# Patient Record
Sex: MALE | Race: BLACK OR AFRICAN AMERICAN | NOT HISPANIC OR LATINO
[De-identification: names, ages, dates, MRNs, and addresses within clinical notes are randomized per-mention and may not be internally consistent; named-entity substitution may affect disease eponyms.]

---

## 2017-02-26 ENCOUNTER — TRANSCRIPTION ENCOUNTER (OUTPATIENT)
Age: 57
End: 2017-02-26

## 2018-01-29 ENCOUNTER — APPOINTMENT (OUTPATIENT)
Dept: CARDIOLOGY | Facility: CLINIC | Age: 58
End: 2018-01-29
Payer: COMMERCIAL

## 2018-01-29 ENCOUNTER — NON-APPOINTMENT (OUTPATIENT)
Age: 58
End: 2018-01-29

## 2018-01-29 VITALS
HEIGHT: 71 IN | HEART RATE: 85 BPM | TEMPERATURE: 98.7 F | BODY MASS INDEX: 29.68 KG/M2 | OXYGEN SATURATION: 97 % | DIASTOLIC BLOOD PRESSURE: 83 MMHG | SYSTOLIC BLOOD PRESSURE: 124 MMHG | WEIGHT: 212 LBS | RESPIRATION RATE: 14 BRPM

## 2018-01-29 PROCEDURE — 99215 OFFICE O/P EST HI 40 MIN: CPT | Mod: 25

## 2018-01-29 PROCEDURE — 93000 ELECTROCARDIOGRAM COMPLETE: CPT

## 2018-02-14 ENCOUNTER — APPOINTMENT (OUTPATIENT)
Dept: CARDIOLOGY | Facility: CLINIC | Age: 58
End: 2018-02-14
Payer: COMMERCIAL

## 2018-02-14 PROCEDURE — 93306 TTE W/DOPPLER COMPLETE: CPT

## 2018-02-15 ENCOUNTER — APPOINTMENT (OUTPATIENT)
Dept: CARDIOLOGY | Facility: CLINIC | Age: 58
End: 2018-02-15
Payer: COMMERCIAL

## 2018-02-15 PROCEDURE — 93015 CV STRESS TEST SUPVJ I&R: CPT

## 2018-02-15 PROCEDURE — A9500: CPT

## 2018-02-15 PROCEDURE — 78452 HT MUSCLE IMAGE SPECT MULT: CPT

## 2018-02-19 LAB
25(OH)D3 SERPL-MCNC: 11 NG/ML
ALBUMIN SERPL ELPH-MCNC: 4.2 G/DL
ALP BLD-CCNC: 85 U/L
ALT SERPL-CCNC: 45 U/L
ANION GAP SERPL CALC-SCNC: 13 MMOL/L
APPEARANCE: CLEAR
AST SERPL-CCNC: 29 U/L
BASOPHILS # BLD AUTO: 0.03 K/UL
BASOPHILS NFR BLD AUTO: 0.6 %
BILIRUB SERPL-MCNC: 0.5 MG/DL
BILIRUBIN URINE: NEGATIVE
BLOOD URINE: NEGATIVE
BUN SERPL-MCNC: 12 MG/DL
CALCIUM SERPL-MCNC: 9.3 MG/DL
CHLORIDE SERPL-SCNC: 100 MMOL/L
CHOLEST SERPL-MCNC: 270 MG/DL
CHOLEST/HDLC SERPL: 8.7 RATIO
CO2 SERPL-SCNC: 26 MMOL/L
COLOR: YELLOW
CREAT SERPL-MCNC: 0.9 MG/DL
EOSINOPHIL # BLD AUTO: 0.13 K/UL
EOSINOPHIL NFR BLD AUTO: 2.6 %
GLUCOSE QUALITATIVE U: NEGATIVE MG/DL
GLUCOSE SERPL-MCNC: 176 MG/DL
HBA1C MFR BLD HPLC: 6.7 %
HCT VFR BLD CALC: 39.9 %
HDLC SERPL-MCNC: 31 MG/DL
HGB BLD-MCNC: 13.7 G/DL
IMM GRANULOCYTES NFR BLD AUTO: 0.2 %
KETONES URINE: NEGATIVE
LDLC SERPL CALC-MCNC: NORMAL
LEUKOCYTE ESTERASE URINE: NEGATIVE
LYMPHOCYTES # BLD AUTO: 1.8 K/UL
LYMPHOCYTES NFR BLD AUTO: 36.3 %
MAN DIFF?: NORMAL
MCHC RBC-ENTMCNC: 32.4 PG
MCHC RBC-ENTMCNC: 34.3 GM/DL
MCV RBC AUTO: 94.3 FL
MONOCYTES # BLD AUTO: 0.37 K/UL
MONOCYTES NFR BLD AUTO: 7.5 %
NEUTROPHILS # BLD AUTO: 2.62 K/UL
NEUTROPHILS NFR BLD AUTO: 52.8 %
NITRITE URINE: NEGATIVE
PH URINE: 7
PLATELET # BLD AUTO: 156 K/UL
POTASSIUM SERPL-SCNC: 4.2 MMOL/L
PROT SERPL-MCNC: 6.3 G/DL
PROTEIN URINE: NEGATIVE MG/DL
PSA FREE FLD-MCNC: 38.7
PSA FREE SERPL-MCNC: 0.24 NG/ML
PSA SERPL-MCNC: 0.62 NG/ML
RBC # BLD: 4.23 M/UL
RBC # FLD: 12.7 %
SODIUM SERPL-SCNC: 139 MMOL/L
SPECIFIC GRAVITY URINE: 1.02
TRIGL SERPL-MCNC: 526 MG/DL
TSH SERPL-ACNC: 2.47 UIU/ML
UROBILINOGEN URINE: 1 MG/DL
WBC # FLD AUTO: 4.96 K/UL

## 2018-02-26 ENCOUNTER — MEDICATION RENEWAL (OUTPATIENT)
Age: 58
End: 2018-02-26

## 2018-02-26 ENCOUNTER — APPOINTMENT (OUTPATIENT)
Dept: CARDIOLOGY | Facility: CLINIC | Age: 58
End: 2018-02-26
Payer: COMMERCIAL

## 2018-02-26 VITALS
RESPIRATION RATE: 16 BRPM | BODY MASS INDEX: 29.12 KG/M2 | TEMPERATURE: 97.7 F | HEART RATE: 82 BPM | SYSTOLIC BLOOD PRESSURE: 118 MMHG | HEIGHT: 71 IN | DIASTOLIC BLOOD PRESSURE: 78 MMHG | WEIGHT: 208 LBS

## 2018-02-26 DIAGNOSIS — E11.10 TYPE 2 DIABETES MELLITUS WITH KETOACIDOSIS WITHOUT COMA: ICD-10-CM

## 2018-02-26 DIAGNOSIS — R06.02 SHORTNESS OF BREATH: ICD-10-CM

## 2018-02-26 PROCEDURE — 99214 OFFICE O/P EST MOD 30 MIN: CPT

## 2018-03-01 ENCOUNTER — TRANSCRIPTION ENCOUNTER (OUTPATIENT)
Age: 58
End: 2018-03-01

## 2018-03-23 ENCOUNTER — APPOINTMENT (OUTPATIENT)
Dept: CARDIOLOGY | Facility: CLINIC | Age: 58
End: 2018-03-23

## 2018-04-04 ENCOUNTER — APPOINTMENT (OUTPATIENT)
Dept: CARDIOLOGY | Facility: CLINIC | Age: 58
End: 2018-04-04
Payer: COMMERCIAL

## 2018-04-04 PROCEDURE — 36415 COLL VENOUS BLD VENIPUNCTURE: CPT

## 2018-04-05 LAB
ALBUMIN SERPL ELPH-MCNC: 4.8 G/DL
ALP BLD-CCNC: 56 U/L
ALT SERPL-CCNC: 29 U/L
ANION GAP SERPL CALC-SCNC: 13 MMOL/L
AST SERPL-CCNC: 28 U/L
BILIRUB SERPL-MCNC: 0.7 MG/DL
BUN SERPL-MCNC: 14 MG/DL
CALCIUM SERPL-MCNC: 9.6 MG/DL
CHLORIDE SERPL-SCNC: 102 MMOL/L
CHOLEST SERPL-MCNC: 165 MG/DL
CHOLEST/HDLC SERPL: 2.9 RATIO
CK SERPL-CCNC: 276 U/L
CO2 SERPL-SCNC: 24 MMOL/L
CREAT SERPL-MCNC: 1.05 MG/DL
GLUCOSE SERPL-MCNC: 93 MG/DL
HDLC SERPL-MCNC: 56 MG/DL
LDLC SERPL CALC-MCNC: 95 MG/DL
POTASSIUM SERPL-SCNC: 4.3 MMOL/L
PROT SERPL-MCNC: 7.5 G/DL
SODIUM SERPL-SCNC: 139 MMOL/L
TRIGL SERPL-MCNC: 70 MG/DL

## 2018-04-09 ENCOUNTER — APPOINTMENT (OUTPATIENT)
Dept: CARDIOLOGY | Facility: CLINIC | Age: 58
End: 2018-04-09
Payer: COMMERCIAL

## 2018-04-09 VITALS
SYSTOLIC BLOOD PRESSURE: 118 MMHG | BODY MASS INDEX: 28 KG/M2 | DIASTOLIC BLOOD PRESSURE: 74 MMHG | OXYGEN SATURATION: 95 % | RESPIRATION RATE: 16 BRPM | HEART RATE: 76 BPM | TEMPERATURE: 98.2 F | HEIGHT: 71 IN | WEIGHT: 200 LBS

## 2018-04-09 PROCEDURE — 99214 OFFICE O/P EST MOD 30 MIN: CPT

## 2018-05-21 ENCOUNTER — RX RENEWAL (OUTPATIENT)
Age: 58
End: 2018-05-21

## 2018-06-14 ENCOUNTER — RX RENEWAL (OUTPATIENT)
Age: 58
End: 2018-06-14

## 2018-07-30 ENCOUNTER — APPOINTMENT (OUTPATIENT)
Dept: CARDIOLOGY | Facility: CLINIC | Age: 58
End: 2018-07-30
Payer: COMMERCIAL

## 2018-07-30 ENCOUNTER — NON-APPOINTMENT (OUTPATIENT)
Age: 58
End: 2018-07-30

## 2018-07-30 VITALS
DIASTOLIC BLOOD PRESSURE: 70 MMHG | SYSTOLIC BLOOD PRESSURE: 110 MMHG | RESPIRATION RATE: 16 BRPM | HEART RATE: 87 BPM | HEIGHT: 71 IN | OXYGEN SATURATION: 98 % | TEMPERATURE: 98.7 F | BODY MASS INDEX: 27.58 KG/M2 | WEIGHT: 197 LBS

## 2018-07-30 VITALS — HEIGHT: 71 IN | BODY MASS INDEX: 27.58 KG/M2 | WEIGHT: 197 LBS

## 2018-07-30 VITALS — OXYGEN SATURATION: 96 % | RESPIRATION RATE: 15 BRPM | HEART RATE: 70 BPM | TEMPERATURE: 98 F

## 2018-07-30 VITALS — DIASTOLIC BLOOD PRESSURE: 70 MMHG | SYSTOLIC BLOOD PRESSURE: 110 MMHG

## 2018-07-30 DIAGNOSIS — R07.89 OTHER CHEST PAIN: ICD-10-CM

## 2018-07-30 PROCEDURE — 93000 ELECTROCARDIOGRAM COMPLETE: CPT

## 2018-07-30 PROCEDURE — 99214 OFFICE O/P EST MOD 30 MIN: CPT | Mod: 25

## 2018-08-15 ENCOUNTER — RX RENEWAL (OUTPATIENT)
Age: 58
End: 2018-08-15

## 2018-08-16 ENCOUNTER — MEDICATION RENEWAL (OUTPATIENT)
Age: 58
End: 2018-08-16

## 2019-02-25 ENCOUNTER — NON-APPOINTMENT (OUTPATIENT)
Age: 59
End: 2019-02-25

## 2019-02-25 ENCOUNTER — APPOINTMENT (OUTPATIENT)
Dept: CARDIOLOGY | Facility: CLINIC | Age: 59
End: 2019-02-25
Payer: COMMERCIAL

## 2019-02-25 VITALS
HEIGHT: 71 IN | DIASTOLIC BLOOD PRESSURE: 76 MMHG | BODY MASS INDEX: 28.98 KG/M2 | OXYGEN SATURATION: 97 % | SYSTOLIC BLOOD PRESSURE: 122 MMHG | WEIGHT: 207 LBS | HEART RATE: 72 BPM

## 2019-02-25 DIAGNOSIS — E55.9 VITAMIN D DEFICIENCY, UNSPECIFIED: ICD-10-CM

## 2019-02-25 PROCEDURE — 93000 ELECTROCARDIOGRAM COMPLETE: CPT

## 2019-02-25 PROCEDURE — 99214 OFFICE O/P EST MOD 30 MIN: CPT | Mod: 25

## 2019-02-25 RX ORDER — BLOOD SUGAR DIAGNOSTIC
STRIP MISCELLANEOUS TWICE DAILY
Qty: 2 | Refills: 1 | Status: ACTIVE | COMMUNITY
Start: 2018-02-26 | End: 1900-01-01

## 2019-02-25 RX ORDER — BLOOD-GLUCOSE METER
W/DEVICE EACH MISCELLANEOUS
Qty: 1 | Refills: 0 | Status: ACTIVE | COMMUNITY
Start: 2018-02-26 | End: 1900-01-01

## 2019-02-25 RX ORDER — LANCETS
EACH MISCELLANEOUS
Qty: 100 | Refills: 1 | Status: ACTIVE | COMMUNITY
Start: 2018-02-26 | End: 1900-01-01

## 2019-02-25 NOTE — HISTORY OF PRESENT ILLNESS
[FreeTextEntry1] : 59 Y/O gentleman PMH: DM, HLD, mildly depressed LV FX, diverticulosis,  elevated CPK , right renal cyst who presents today in routine follow up\par \par States he ran out of his meds in January and relates he did not contact office for refills so he has stopped all Meds and is presently not taking any medications \par \par Echo 2018 AO upper limits of norm with mildly depressed LV FX ( off ACE )\par Nuclear 2018\par \par \par \par \par \par his nuclear stress test was normal range   , however his echo showed mild global hypokinesis  LVH ,  blood sugar was elevated , with elevated TG and TC  low vitamin d \par  . \par Patients blood pressure is controlled ,  \par \par Patient was seen by dr mohan  , had scant blood in urine , CT abdomen showing right renal cyst 4.7 cm .

## 2019-02-25 NOTE — PHYSICAL EXAM
[General Appearance - Well Developed] : well developed [Normal Appearance] : normal appearance [Well Groomed] : well groomed [General Appearance - Well Nourished] : well nourished [No Deformities] : no deformities [General Appearance - In No Acute Distress] : no acute distress [Normal Conjunctiva] : the conjunctiva exhibited no abnormalities [] : no respiratory distress [Respiration, Rhythm And Depth] : normal respiratory rhythm and effort [Exaggerated Use Of Accessory Muscles For Inspiration] : no accessory muscle use [Auscultation Breath Sounds / Voice Sounds] : lungs were clear to auscultation bilaterally [Heart Rate And Rhythm] : heart rate and rhythm were normal [Heart Sounds] : normal S1 and S2 [Abdomen Soft] : soft [Abnormal Walk] : normal gait [FreeTextEntry1] : No lE edema  [Skin Color & Pigmentation] : normal skin color and pigmentation [Oriented To Time, Place, And Person] : oriented to person, place, and time

## 2019-02-25 NOTE — DISCUSSION/SUMMARY
[FreeTextEntry1] : Depressed LV FX Mild by Echo will moniter yearly and ordered today \par \par HTN well controlled will resume medications as previously taking\par \par DM: Will Obtain additional labs, resume al medications and continue with home glucose monitoring.  Additional screening testing ordered \par \par Advised strict adherence to taking daily medications as prescribed\par \par OV 3 weeks

## 2019-02-25 NOTE — REASON FOR VISIT
[Follow-Up - Clinic] : a clinic follow-up of [Hyperlipidemia] : hyperlipidemia [Hypertension] : hypertension [Medication Management] : Medication management [FreeTextEntry1] : DM

## 2019-03-18 ENCOUNTER — APPOINTMENT (OUTPATIENT)
Dept: CARDIOLOGY | Facility: CLINIC | Age: 59
End: 2019-03-18
Payer: COMMERCIAL

## 2019-03-18 VITALS
HEART RATE: 60 BPM | OXYGEN SATURATION: 96 % | BODY MASS INDEX: 28.42 KG/M2 | HEIGHT: 71 IN | WEIGHT: 203 LBS | SYSTOLIC BLOOD PRESSURE: 119 MMHG | DIASTOLIC BLOOD PRESSURE: 70 MMHG | RESPIRATION RATE: 18 BRPM

## 2019-03-18 PROCEDURE — 99214 OFFICE O/P EST MOD 30 MIN: CPT

## 2019-03-18 RX ORDER — GEMFIBROZIL 600 MG/1
600 TABLET, FILM COATED ORAL TWICE DAILY
Qty: 60 | Refills: 2 | Status: DISCONTINUED | COMMUNITY
Start: 2018-02-19 | End: 2019-03-18

## 2019-03-21 NOTE — DISCUSSION/SUMMARY
[FreeTextEntry1] : Depressed LV FX Mild by Echo.  Has since resumed ACE and will moniter with yearly surveillance echocardiograms ( Ordered ) \par \par HTN: Well controlled will continue current medications \par \par DM: Continue with home glucose monitoring.  Additional CV screening testing ordered \par \par HLD not well controlled will stop Gemfibrozil and begin Livalo repeat labs 6 weeks \par \par Low Vit D: replaced as ordered \par \par Advised strict adherence to taking daily medications as prescribed\par \par OV 3 months

## 2019-03-21 NOTE — HISTORY OF PRESENT ILLNESS
[FreeTextEntry1] : 57 Y/O gentleman PMH: DM, HLD, mildly depressed LV FX, diverticulosis,  elevated CPK , right renal cyst who presents today for BP check after resuming his antihypertensive medications which he claims to be tolerating well and pressure well controlled. \par \par recent labs reviewed Vit D replaced.  Lipids/LDL elevated \par \par Echo 2018 AO upper limits of norm with mildly depressed LV FX ( off ACE )\par Nuclear 2018\par \par \par \par \par \par his nuclear stress test was normal range   , however his echo showed mild global hypokinesis  LVH ,  blood sugar was elevated , with elevated TG and TC  low vitamin d \par  . \par Patients blood pressure is controlled ,  \par \par Patient was seen by dr mohan  , had scant blood in urine , CT abdomen showing right renal cyst 4.7 cm .

## 2019-03-21 NOTE — PHYSICAL EXAM
[Well Groomed] : well groomed [General Appearance - Well Developed] : well developed [Normal Appearance] : normal appearance [General Appearance - Well Nourished] : well nourished [No Deformities] : no deformities [General Appearance - In No Acute Distress] : no acute distress [Normal Conjunctiva] : the conjunctiva exhibited no abnormalities [Respiration, Rhythm And Depth] : normal respiratory rhythm and effort [] : no respiratory distress [Exaggerated Use Of Accessory Muscles For Inspiration] : no accessory muscle use [Auscultation Breath Sounds / Voice Sounds] : lungs were clear to auscultation bilaterally [Heart Rate And Rhythm] : heart rate and rhythm were normal [Abdomen Soft] : soft [Heart Sounds] : normal S1 and S2 [Abnormal Walk] : normal gait [Skin Color & Pigmentation] : normal skin color and pigmentation [Oriented To Time, Place, And Person] : oriented to person, place, and time [FreeTextEntry1] : No lE edema

## 2019-04-16 ENCOUNTER — RX CHANGE (OUTPATIENT)
Age: 59
End: 2019-04-16

## 2019-04-18 ENCOUNTER — RX CHANGE (OUTPATIENT)
Age: 59
End: 2019-04-18

## 2019-04-24 ENCOUNTER — RX RENEWAL (OUTPATIENT)
Age: 59
End: 2019-04-24

## 2019-04-24 ENCOUNTER — RX CHANGE (OUTPATIENT)
Age: 59
End: 2019-04-24

## 2019-04-24 RX ORDER — PITAVASTATIN CALCIUM 2.09 MG/1
2 TABLET, FILM COATED ORAL
Qty: 30 | Refills: 3 | Status: DISCONTINUED | COMMUNITY
Start: 2019-03-18 | End: 2019-04-24

## 2019-05-20 ENCOUNTER — MEDICATION RENEWAL (OUTPATIENT)
Age: 59
End: 2019-05-20

## 2019-05-20 ENCOUNTER — RX RENEWAL (OUTPATIENT)
Age: 59
End: 2019-05-20

## 2019-05-21 ENCOUNTER — MEDICATION RENEWAL (OUTPATIENT)
Age: 59
End: 2019-05-21

## 2019-05-21 ENCOUNTER — RX RENEWAL (OUTPATIENT)
Age: 59
End: 2019-05-21

## 2019-07-22 ENCOUNTER — RX RENEWAL (OUTPATIENT)
Age: 59
End: 2019-07-22

## 2019-07-23 ENCOUNTER — MEDICATION RENEWAL (OUTPATIENT)
Age: 59
End: 2019-07-23

## 2019-10-31 ENCOUNTER — MEDICATION RENEWAL (OUTPATIENT)
Age: 59
End: 2019-10-31

## 2019-11-11 ENCOUNTER — RX RENEWAL (OUTPATIENT)
Age: 59
End: 2019-11-11

## 2019-11-13 ENCOUNTER — RX RENEWAL (OUTPATIENT)
Age: 59
End: 2019-11-13

## 2019-12-03 ENCOUNTER — APPOINTMENT (OUTPATIENT)
Dept: UROLOGY | Facility: CLINIC | Age: 59
End: 2019-12-03
Payer: COMMERCIAL

## 2019-12-03 VITALS
DIASTOLIC BLOOD PRESSURE: 71 MMHG | BODY MASS INDEX: 29.31 KG/M2 | HEIGHT: 71.5 IN | HEART RATE: 71 BPM | SYSTOLIC BLOOD PRESSURE: 109 MMHG | WEIGHT: 214 LBS | OXYGEN SATURATION: 97 %

## 2019-12-03 PROCEDURE — 99244 OFF/OP CNSLTJ NEW/EST MOD 40: CPT

## 2019-12-03 NOTE — REVIEW OF SYSTEMS
[Eyesight Problems] : eyesight problems [Poor quality erections] : Poor quality erections [Strain or push to urinate] : strain or push to urinate [Slow urine stream] : slow urine stream [Negative] : Heme/Lymph

## 2019-12-03 NOTE — PHYSICAL EXAM
[Normal Appearance] : normal appearance [General Appearance - Well Developed] : well developed [General Appearance - Well Nourished] : well nourished [Edema] : no peripheral edema [Well Groomed] : well groomed [General Appearance - In No Acute Distress] : no acute distress [Exaggerated Use Of Accessory Muscles For Inspiration] : no accessory muscle use [Respiration, Rhythm And Depth] : normal respiratory rhythm and effort [Abdomen Soft] : soft [Abdomen Tenderness] : non-tender [Costovertebral Angle Tenderness] : no ~M costovertebral angle tenderness [Urethral Meatus] : meatus normal [Urinary Bladder Findings] : the bladder was normal on palpation [Scrotum] : the scrotum was normal [Prostate Tenderness] : the prostate was not tender [No Prostate Nodules] : no prostate nodules [Testes Mass (___cm)] : there were no testicular masses [Normal Station and Gait] : the gait and station were normal for the patient's age [] : no rash [Oriented To Time, Place, And Person] : oriented to person, place, and time [Affect] : the affect was normal [No Focal Deficits] : no focal deficits [Not Anxious] : not anxious [No Palpable Adenopathy] : no palpable adenopathy [Mood] : the mood was normal

## 2019-12-04 LAB
APPEARANCE: CLEAR
BACTERIA: NEGATIVE
BILIRUBIN URINE: NEGATIVE
BLOOD URINE: NEGATIVE
COLOR: YELLOW
GLUCOSE QUALITATIVE U: NEGATIVE
HYALINE CASTS: 0 /LPF
KETONES URINE: NEGATIVE
LEUKOCYTE ESTERASE URINE: NEGATIVE
MICROSCOPIC-UA: NORMAL
NITRITE URINE: NEGATIVE
PH URINE: 6.5
PROTEIN URINE: NORMAL
RED BLOOD CELLS URINE: 1 /HPF
SPECIFIC GRAVITY URINE: 1.03
SQUAMOUS EPITHELIAL CELLS: 0 /HPF
UROBILINOGEN URINE: NORMAL
WHITE BLOOD CELLS URINE: 0 /HPF

## 2019-12-05 LAB
BACTERIA UR CULT: NORMAL
URINE CYTOLOGY: NORMAL

## 2019-12-07 NOTE — HISTORY OF PRESENT ILLNESS
[FreeTextEntry1] : 59 year old male with history of HTN and diabetes presents with complaint of urinary frequency, hesitancy and weak stream. He denies any fevers, chills, dysuria, hematuria. Patient's PVR today is is 0cc. He also has complains of erectile dysfunction.

## 2019-12-07 NOTE — ASSESSMENT
[FreeTextEntry1] : This patient presents with obstructive lower urinary tract symptoms most likely secondary to BPH. Will obtain urine studies to rule out any UTI. Patient will be given samples of Flomax and terazosin. He will follow up for a TRUSP.

## 2019-12-18 ENCOUNTER — APPOINTMENT (OUTPATIENT)
Dept: UROLOGY | Facility: CLINIC | Age: 59
End: 2019-12-18

## 2019-12-18 ENCOUNTER — APPOINTMENT (OUTPATIENT)
Dept: UROLOGY | Facility: CLINIC | Age: 59
End: 2019-12-18
Payer: COMMERCIAL

## 2019-12-18 VITALS
OXYGEN SATURATION: 97 % | SYSTOLIC BLOOD PRESSURE: 115 MMHG | HEART RATE: 86 BPM | BODY MASS INDEX: 29.68 KG/M2 | HEIGHT: 71 IN | DIASTOLIC BLOOD PRESSURE: 73 MMHG | WEIGHT: 212 LBS

## 2019-12-18 PROCEDURE — 51741 ELECTRO-UROFLOWMETRY FIRST: CPT

## 2019-12-18 PROCEDURE — 76857 US EXAM PELVIC LIMITED: CPT

## 2019-12-18 PROCEDURE — 76872 US TRANSRECTAL: CPT

## 2019-12-20 ENCOUNTER — MEDICATION RENEWAL (OUTPATIENT)
Age: 59
End: 2019-12-20

## 2019-12-21 ENCOUNTER — TRANSCRIPTION ENCOUNTER (OUTPATIENT)
Age: 59
End: 2019-12-21

## 2020-01-07 ENCOUNTER — APPOINTMENT (OUTPATIENT)
Dept: UROLOGY | Facility: CLINIC | Age: 60
End: 2020-01-07
Payer: COMMERCIAL

## 2020-01-07 VITALS
BODY MASS INDEX: 29.68 KG/M2 | DIASTOLIC BLOOD PRESSURE: 79 MMHG | SYSTOLIC BLOOD PRESSURE: 121 MMHG | WEIGHT: 212 LBS | HEART RATE: 87 BPM | HEIGHT: 71 IN | OXYGEN SATURATION: 97 %

## 2020-01-07 PROCEDURE — 54235 NJX CORPORA CAVERNOSA RX AGT: CPT

## 2020-01-07 PROCEDURE — 93980 PENILE VASCULAR STUDY: CPT

## 2020-01-08 ENCOUNTER — RX CHANGE (OUTPATIENT)
Age: 60
End: 2020-01-08

## 2020-02-03 ENCOUNTER — RX RENEWAL (OUTPATIENT)
Age: 60
End: 2020-02-03

## 2020-02-03 DIAGNOSIS — Z00.00 ENCOUNTER FOR GENERAL ADULT MEDICAL EXAMINATION W/OUT ABNORMAL FINDINGS: ICD-10-CM

## 2020-02-06 LAB
25(OH)D3 SERPL-MCNC: 43.9 NG/ML
ALBUMIN SERPL ELPH-MCNC: 4.9 G/DL
ALP BLD-CCNC: 56 U/L
ALT SERPL-CCNC: 34 U/L
ANION GAP SERPL CALC-SCNC: 17 MMOL/L
APPEARANCE: CLEAR
AST SERPL-CCNC: 25 U/L
BASOPHILS # BLD AUTO: 0.04 K/UL
BASOPHILS NFR BLD AUTO: 0.7 %
BILIRUB SERPL-MCNC: 0.7 MG/DL
BILIRUBIN URINE: NEGATIVE
BLOOD URINE: NEGATIVE
BUN SERPL-MCNC: 9 MG/DL
CALCIUM SERPL-MCNC: 9.9 MG/DL
CHLORIDE SERPL-SCNC: 101 MMOL/L
CHOLEST SERPL-MCNC: 139 MG/DL
CHOLEST/HDLC SERPL: 3.2 RATIO
CO2 SERPL-SCNC: 25 MMOL/L
COLOR: NORMAL
CREAT SERPL-MCNC: 0.89 MG/DL
EOSINOPHIL # BLD AUTO: 0.16 K/UL
EOSINOPHIL NFR BLD AUTO: 2.6 %
ESTIMATED AVERAGE GLUCOSE: 100 MG/DL
GLUCOSE QUALITATIVE U: NEGATIVE
GLUCOSE SERPL-MCNC: 76 MG/DL
HBA1C MFR BLD HPLC: 5.1 %
HCT VFR BLD CALC: 41.9 %
HDLC SERPL-MCNC: 44 MG/DL
HGB BLD-MCNC: 13.4 G/DL
IMM GRANULOCYTES NFR BLD AUTO: 0.3 %
KETONES URINE: NEGATIVE
LDLC SERPL CALC-MCNC: 63 MG/DL
LEUKOCYTE ESTERASE URINE: NEGATIVE
LYMPHOCYTES # BLD AUTO: 2.26 K/UL
LYMPHOCYTES NFR BLD AUTO: 37.4 %
MAN DIFF?: NORMAL
MCHC RBC-ENTMCNC: 31.4 PG
MCHC RBC-ENTMCNC: 32 GM/DL
MCV RBC AUTO: 98.1 FL
MONOCYTES # BLD AUTO: 0.39 K/UL
MONOCYTES NFR BLD AUTO: 6.5 %
NEUTROPHILS # BLD AUTO: 3.17 K/UL
NEUTROPHILS NFR BLD AUTO: 52.5 %
NITRITE URINE: NEGATIVE
PH URINE: 6
PLATELET # BLD AUTO: 157 K/UL
POTASSIUM SERPL-SCNC: 4.3 MMOL/L
PROT SERPL-MCNC: 7.5 G/DL
PROTEIN URINE: NEGATIVE
PSA FREE FLD-MCNC: 35 %
PSA FREE SERPL-MCNC: 0.3 NG/ML
PSA SERPL-MCNC: 0.85 NG/ML
RBC # BLD: 4.27 M/UL
RBC # FLD: 12 %
SODIUM SERPL-SCNC: 143 MMOL/L
SPECIFIC GRAVITY URINE: 1.02
TRIGL SERPL-MCNC: 163 MG/DL
TSH SERPL-ACNC: 3.06 UIU/ML
UROBILINOGEN URINE: NORMAL
WBC # FLD AUTO: 6.04 K/UL

## 2020-02-10 ENCOUNTER — APPOINTMENT (OUTPATIENT)
Dept: CARDIOLOGY | Facility: CLINIC | Age: 60
End: 2020-02-10
Payer: COMMERCIAL

## 2020-02-10 ENCOUNTER — NON-APPOINTMENT (OUTPATIENT)
Age: 60
End: 2020-02-10

## 2020-02-10 VITALS
SYSTOLIC BLOOD PRESSURE: 119 MMHG | HEIGHT: 71 IN | OXYGEN SATURATION: 97 % | HEART RATE: 73 BPM | DIASTOLIC BLOOD PRESSURE: 78 MMHG | WEIGHT: 207 LBS | BODY MASS INDEX: 28.98 KG/M2 | RESPIRATION RATE: 18 BRPM

## 2020-02-10 PROCEDURE — 93000 ELECTROCARDIOGRAM COMPLETE: CPT

## 2020-02-10 PROCEDURE — 99214 OFFICE O/P EST MOD 30 MIN: CPT

## 2020-02-10 RX ORDER — ASPIRIN 81 MG
81 TABLET, DELAYED RELEASE (ENTERIC COATED) ORAL DAILY
Qty: 30 | Refills: 2 | Status: DISCONTINUED | COMMUNITY
Start: 2018-02-26 | End: 2020-02-10

## 2020-02-10 NOTE — REVIEW OF SYSTEMS
[Eyeglasses] : not currently wearing eyeglasses [Chills] : no chills [Earache] : no earache [Shortness Of Breath] : no shortness of breath [Dyspnea on exertion] : dyspnea during exertion [Chest  Pressure] : no chest pressure [Chest Pain] : no chest pain [Lower Ext Edema] : no extremity edema [Leg Claudication] : no intermittent leg claudication [Palpitations] : no palpitations [Abdominal Pain] : no abdominal pain [Nausea] : no nausea [Change in Appetite] : no change in appetite [see HPI] : see HPI [Heartburn] : no heartburn [Hematuria] : no hematuria [Impotence] : no impotence [Urinary Frequency] : no change in urinary frequency [Joint Pain] : no joint pain [Muscle Cramps] : no muscle cramps

## 2020-02-10 NOTE — REASON FOR VISIT
[Follow-Up - Clinic] : a clinic follow-up of [Hyperlipidemia] : hyperlipidemia [Hypertension] : hypertension [FreeTextEntry1] : DM [Medication Management] : Medication management

## 2020-02-10 NOTE — PHYSICAL EXAM
[General Appearance - Well Developed] : well developed [General Appearance - Well Nourished] : well nourished [Normal Oral Mucosa] : normal oral mucosa [Normal Jugular Venous A Waves Present] : normal jugular venous A waves present [Exaggerated Use Of Accessory Muscles For Inspiration] : no accessory muscle use [Respiration, Rhythm And Depth] : normal respiratory rhythm and effort [Auscultation Breath Sounds / Voice Sounds] : lungs were clear to auscultation bilaterally [] : no respiratory distress [Chest Palpation] : palpation of the chest revealed no abnormalities [Heart Rate And Rhythm] : heart rate and rhythm were normal [Lungs Percussion] : the lungs were normal to percussion [Edema] : no peripheral edema present [Heart Sounds] : normal S1 and S2 [Arterial Pulses Normal] : the arterial pulses were normal [Veins - Varicosity Changes] : no varicosital changes were noted in the lower extremities [Systolic grade ___/6] : A grade [unfilled]/6 systolic murmur was heard. [Bowel Sounds] : normal bowel sounds [Abnormal Walk] : normal gait [FreeTextEntry1] : mild tenderness of left shoulder , range of motion is ok , but with mild ache ,  [Skin Turgor] : normal skin turgor [Nail Clubbing] : no clubbing of the fingernails [Neck Supple] : was supple [Oriented To Time, Place, And Person] : oriented to person, place, and time [Normal Appearance] : was normal in appearance [Normal] : the thyroid was normal

## 2020-02-10 NOTE — HISTORY OF PRESENT ILLNESS
[FreeTextEntry1] : Patient  with hx diverticulosis , hyperlipidemia , elevated CPK , right renal cyst who came for follow up \par patient denies any active symptoms , patient being followed  urology , patient blood work showed normal BS Hb A1c ,  normal lipids on medication , normal BP on medication \par \par \par \par \par his nuclear stress test was normal range   , however his echo showed mild global hypokinesis  LVH ,  blood sugar was elevated , with elevated TG and TC  low vitamin d \par  . \par Patients blood pressure is controlled ,  \par \par Patient was seen by dr mohan  , had scant blood in urine , CT abdomen showing right renal cyst 4.7 cm .

## 2020-03-12 ENCOUNTER — APPOINTMENT (OUTPATIENT)
Dept: CARDIOLOGY | Facility: CLINIC | Age: 60
End: 2020-03-12
Payer: COMMERCIAL

## 2020-03-12 PROCEDURE — 93306 TTE W/DOPPLER COMPLETE: CPT

## 2020-04-14 RX ORDER — ERGOCALCIFEROL 1.25 MG/1
1.25 MG CAPSULE ORAL
Qty: 8 | Refills: 1 | Status: DISCONTINUED | COMMUNITY
Start: 2019-03-12 | End: 2020-04-14

## 2020-05-04 ENCOUNTER — RX RENEWAL (OUTPATIENT)
Age: 60
End: 2020-05-04

## 2020-05-05 ENCOUNTER — RX RENEWAL (OUTPATIENT)
Age: 60
End: 2020-05-05

## 2020-06-15 ENCOUNTER — APPOINTMENT (OUTPATIENT)
Dept: CARDIOLOGY | Facility: CLINIC | Age: 60
End: 2020-06-15

## 2020-06-30 ENCOUNTER — NON-APPOINTMENT (OUTPATIENT)
Age: 60
End: 2020-06-30

## 2020-06-30 ENCOUNTER — APPOINTMENT (OUTPATIENT)
Dept: CARDIOLOGY | Facility: CLINIC | Age: 60
End: 2020-06-30
Payer: COMMERCIAL

## 2020-06-30 VITALS
WEIGHT: 218 LBS | HEART RATE: 83 BPM | SYSTOLIC BLOOD PRESSURE: 117 MMHG | BODY MASS INDEX: 30.52 KG/M2 | OXYGEN SATURATION: 99 % | HEIGHT: 71 IN | DIASTOLIC BLOOD PRESSURE: 76 MMHG

## 2020-06-30 DIAGNOSIS — I51.89 OTHER ILL-DEFINED HEART DISEASES: ICD-10-CM

## 2020-06-30 PROCEDURE — 93000 ELECTROCARDIOGRAM COMPLETE: CPT

## 2020-06-30 PROCEDURE — 99214 OFFICE O/P EST MOD 30 MIN: CPT

## 2020-06-30 RX ORDER — CARVEDILOL 3.12 MG/1
3.12 TABLET, FILM COATED ORAL
Qty: 180 | Refills: 1 | Status: DISCONTINUED | COMMUNITY
Start: 2018-02-26 | End: 2020-06-30

## 2020-06-30 RX ORDER — ERGOCALCIFEROL 1.25 MG/1
1.25 MG CAPSULE, LIQUID FILLED ORAL
Qty: 12 | Refills: 1 | Status: DISCONTINUED | COMMUNITY
Start: 2018-02-26 | End: 2020-06-30

## 2020-06-30 RX ORDER — SILDENAFIL 100 MG/1
100 TABLET, FILM COATED ORAL
Qty: 18 | Refills: 11 | Status: DISCONTINUED | COMMUNITY
Start: 2020-01-07 | End: 2020-06-30

## 2020-06-30 RX ORDER — TADALAFIL 20 MG/1
20 TABLET ORAL
Qty: 18 | Refills: 0 | Status: DISCONTINUED | COMMUNITY
Start: 2020-01-08 | End: 2020-06-30

## 2020-06-30 RX ORDER — TERAZOSIN 5 MG/1
5 CAPSULE ORAL
Qty: 7 | Refills: 0 | Status: DISCONTINUED | OUTPATIENT
Start: 2019-12-03 | End: 2020-06-30

## 2020-06-30 NOTE — HISTORY OF PRESENT ILLNESS
[FreeTextEntry1] : Patient  with hx diverticulosis , hyperlipidemia , elevated CPK , right renal cyst who came for follow up \par patient denies any active symptoms , he wants to get off of  BB  as it is interfering with is sexual life   patients echo showed normal ventricular systolic function \par \par patient blood work showed normal BS Hb A1c ,  normal lipids on medication , normal BP on medication \par \par \par \par \par his nuclear stress test was normal range   , however his echo showed mild global hypokinesis  LVH ,  blood sugar was elevated , with elevated TG and TC  low vitamin d \par  . \par Patients blood pressure is controlled ,  \par \par Patient was seen by dr mohan  , had scant blood in urine , CT abdomen showing right renal cyst 4.7 cm .

## 2020-06-30 NOTE — PHYSICAL EXAM
[General Appearance - Well Nourished] : well nourished [General Appearance - Well Developed] : well developed [] : no respiratory distress [Normal Jugular Venous A Waves Present] : normal jugular venous A waves present [Normal Oral Mucosa] : normal oral mucosa [Exaggerated Use Of Accessory Muscles For Inspiration] : no accessory muscle use [Auscultation Breath Sounds / Voice Sounds] : lungs were clear to auscultation bilaterally [Respiration, Rhythm And Depth] : normal respiratory rhythm and effort [Chest Palpation] : palpation of the chest revealed no abnormalities [Lungs Percussion] : the lungs were normal to percussion [Heart Rate And Rhythm] : heart rate and rhythm were normal [Arterial Pulses Normal] : the arterial pulses were normal [Edema] : no peripheral edema present [Heart Sounds] : normal S1 and S2 [Veins - Varicosity Changes] : no varicosital changes were noted in the lower extremities [Systolic grade ___/6] : A grade [unfilled]/6 systolic murmur was heard. [Bowel Sounds] : normal bowel sounds [Nail Clubbing] : no clubbing of the fingernails [FreeTextEntry1] : mild tenderness of left shoulder , range of motion is ok , but with mild ache ,  [Abnormal Walk] : normal gait [Oriented To Time, Place, And Person] : oriented to person, place, and time [Skin Turgor] : normal skin turgor [Normal Appearance] : was normal in appearance [Neck Supple] : was supple [Normal] : the thyroid was normal

## 2020-06-30 NOTE — REVIEW OF SYSTEMS
[Earache] : no earache [Chills] : no chills [Eyeglasses] : not currently wearing eyeglasses [Chest  Pressure] : no chest pressure [Shortness Of Breath] : no shortness of breath [Dyspnea on exertion] : dyspnea during exertion [Lower Ext Edema] : no extremity edema [Chest Pain] : no chest pain [Leg Claudication] : no intermittent leg claudication [Palpitations] : no palpitations [Abdominal Pain] : no abdominal pain [Nausea] : no nausea [Heartburn] : no heartburn [Change in Appetite] : no change in appetite [see HPI] : see HPI [Urinary Frequency] : no change in urinary frequency [Hematuria] : no hematuria [Impotence] : no impotence [Joint Pain] : no joint pain [Muscle Cramps] : no muscle cramps

## 2020-07-29 ENCOUNTER — RX RENEWAL (OUTPATIENT)
Age: 60
End: 2020-07-29

## 2020-09-30 ENCOUNTER — RX RENEWAL (OUTPATIENT)
Age: 60
End: 2020-09-30

## 2020-11-09 ENCOUNTER — NON-APPOINTMENT (OUTPATIENT)
Age: 60
End: 2020-11-09

## 2020-11-09 ENCOUNTER — APPOINTMENT (OUTPATIENT)
Dept: CARDIOLOGY | Facility: CLINIC | Age: 60
End: 2020-11-09
Payer: COMMERCIAL

## 2020-11-09 VITALS
SYSTOLIC BLOOD PRESSURE: 110 MMHG | DIASTOLIC BLOOD PRESSURE: 74 MMHG | WEIGHT: 219 LBS | HEIGHT: 71 IN | TEMPERATURE: 98.3 F | OXYGEN SATURATION: 99 % | HEART RATE: 84 BPM | BODY MASS INDEX: 30.66 KG/M2

## 2020-11-09 DIAGNOSIS — R74.8 ABNORMAL LEVELS OF OTHER SERUM ENZYMES: ICD-10-CM

## 2020-11-09 PROCEDURE — 93000 ELECTROCARDIOGRAM COMPLETE: CPT

## 2020-11-09 PROCEDURE — 99214 OFFICE O/P EST MOD 30 MIN: CPT | Mod: 25

## 2020-11-09 PROCEDURE — 99072 ADDL SUPL MATRL&STAF TM PHE: CPT

## 2020-11-09 RX ORDER — COLD-HOT PACK
125 MCG EACH MISCELLANEOUS EVERY OTHER DAY
Refills: 0 | Status: ACTIVE | COMMUNITY

## 2020-11-10 NOTE — HISTORY OF PRESENT ILLNESS
[FreeTextEntry1] : 61 Y/O gentleman PMH: DM, HLD, mildly depressed LV FX, diverticulosis,  elevated CPK , right renal cyst who presents today in routine cardiac \par \par Claims he is doing well \par \par Labs 2/2020\par \par Echo 3/12/20  AO upper limits of norm stable, Mild MR, Mild LVH, Improved EF \par  \par Nuclear 2018\par  \par \par \par \par \par his nuclear stress test was normal range   , however his echo showed mild global hypokinesis  LVH ,  blood sugar was elevated , with elevated TG and TC  low vitamin d \par  . \par Patients blood pressure is controlled ,  \par \par Patient was seen by dr mohan  , had scant blood in urine , CT abdomen showing right renal cyst 4.7 cm .

## 2020-11-10 NOTE — DISCUSSION/SUMMARY
[FreeTextEntry1] : DM: Medical management refer to establish primary care with PCP \par \par HLD: Labs ordered continue statin\par \par Depressed LV FX: Echo reviewed EF improved,  patient feeling well \par \par Mild Elevated CPK: Repeat with labs now \par \par Right renal cyst: Patient was seen by Dr. Ya  had scant blood in urine CT abdomen showing right renal cyst 4.7 cm continues to follow with Urology

## 2021-01-19 ENCOUNTER — RX RENEWAL (OUTPATIENT)
Age: 61
End: 2021-01-19

## 2021-02-16 ENCOUNTER — APPOINTMENT (OUTPATIENT)
Dept: CARDIOLOGY | Facility: CLINIC | Age: 61
End: 2021-02-16

## 2021-03-31 ENCOUNTER — TRANSCRIPTION ENCOUNTER (OUTPATIENT)
Age: 61
End: 2021-03-31

## 2021-04-12 ENCOUNTER — APPOINTMENT (OUTPATIENT)
Dept: CARDIOLOGY | Facility: CLINIC | Age: 61
End: 2021-04-12

## 2021-06-06 NOTE — REASON FOR VISIT
[Arrhythmia/ECG Abnorrmalities] : arrhythmia/ECG abnormalities [Structural Heart and Valve Disease] : structural heart and valve disease [Hyperlipidemia] : hyperlipidemia [Hypertension] : hypertension [Other: ____] : [unfilled]

## 2021-06-06 NOTE — PHYSICAL EXAM
[No Acute Distress] : no acute distress [Obese] : obese [Normal Conjunctiva] : normal conjunctiva [Normal Venous Pressure] : normal venous pressure [No Carotid Bruit] : no carotid bruit [Normal S1, S2] : normal S1, S2 [No Murmur] : no murmur [No Rub] : no rub [No Gallop] : no gallop [Good Air Entry] : good air entry [Clear Lung Fields] : clear lung fields [No Respiratory Distress] : no respiratory distress  [Soft] : abdomen soft [Non Tender] : non-tender [No Masses/organomegaly] : no masses/organomegaly [Normal Bowel Sounds] : normal bowel sounds [No Edema] : no edema [Normal Gait] : normal gait [No Cyanosis] : no cyanosis [No Clubbing] : no clubbing [No Varicosities] : no varicosities [No Rash] : no rash [No Skin Lesions] : no skin lesions [Moves all extremities] : moves all extremities [No Focal Deficits] : no focal deficits [Normal Speech] : normal speech [Alert and Oriented] : alert and oriented [Normal memory] : normal memory

## 2021-06-08 ENCOUNTER — NON-APPOINTMENT (OUTPATIENT)
Age: 61
End: 2021-06-08

## 2021-06-08 ENCOUNTER — APPOINTMENT (OUTPATIENT)
Dept: CARDIOLOGY | Facility: CLINIC | Age: 61
End: 2021-06-08
Payer: COMMERCIAL

## 2021-06-08 VITALS
HEIGHT: 71 IN | DIASTOLIC BLOOD PRESSURE: 74 MMHG | SYSTOLIC BLOOD PRESSURE: 114 MMHG | TEMPERATURE: 97.6 F | WEIGHT: 216 LBS | OXYGEN SATURATION: 97 % | BODY MASS INDEX: 30.24 KG/M2 | HEART RATE: 61 BPM

## 2021-06-08 PROCEDURE — 99072 ADDL SUPL MATRL&STAF TM PHE: CPT

## 2021-06-08 PROCEDURE — 99214 OFFICE O/P EST MOD 30 MIN: CPT

## 2021-06-08 PROCEDURE — 93000 ELECTROCARDIOGRAM COMPLETE: CPT

## 2021-06-08 NOTE — HISTORY OF PRESENT ILLNESS
[FreeTextEntry1] : Patient  with hx diverticulosis , hyperlipidemia , elevated CPK , right renal cyst who came for follow up \par patient denies any active symptoms , \par \par \par  patients echo showed normal ventricular systolic function  patient does exercise regularly without any symptoms \par \par patient blood work showed normal BS Hb A1c , mild elevated TG low HDL  , normal BP on medication \par  . \par \par Patient was seen by dr mohan  , had scant blood in urine , CT abdomen showing right renal cyst 4.7 cm .

## 2021-06-08 NOTE — CARDIOLOGY SUMMARY
[de-identified] : sinus bradycardia with J point ST elevation  early repolarization  [de-identified] : 2/15/18 10 METS  92%MPHR normal perfusion scan EF 49% [de-identified] : 3/12/20 Mild LVH EF 60% mild dilated aortic root 3.9 cm , Mild MR

## 2021-06-08 NOTE — DISCUSSION/SUMMARY
[FreeTextEntry1] : HTN hypertensive heart disease    controlled continue medication \par \par Mild dilated aortic root  probably normal for his height , continue to monitor \par \par Abnormal EKG normal variant ekg , \par \par Mild MR  continue to monitor \par \par DM type II , controlled continue diet restriction and medication \par \par Mixed hyperlipidemia : continue current medication \par \par BPH  continue medication

## 2021-06-16 ENCOUNTER — APPOINTMENT (OUTPATIENT)
Dept: UROLOGY | Facility: CLINIC | Age: 61
End: 2021-06-16
Payer: COMMERCIAL

## 2021-06-16 PROCEDURE — 99212 OFFICE O/P EST SF 10 MIN: CPT

## 2021-06-16 PROCEDURE — 99072 ADDL SUPL MATRL&STAF TM PHE: CPT

## 2021-06-16 NOTE — PHYSICAL EXAM
[General Appearance - Well Developed] : well developed [General Appearance - Well Nourished] : well nourished [Normal Appearance] : normal appearance [Well Groomed] : well groomed [General Appearance - In No Acute Distress] : no acute distress [Abdomen Soft] : soft [Abdomen Tenderness] : non-tender [Costovertebral Angle Tenderness] : no ~M costovertebral angle tenderness [Urethral Meatus] : meatus normal [Urinary Bladder Findings] : the bladder was normal on palpation [Scrotum] : the scrotum was normal [Testes Mass (___cm)] : there were no testicular masses [Prostate Tenderness] : the prostate was not tender [No Prostate Nodules] : no prostate nodules [Edema] : no peripheral edema [] : no respiratory distress [Respiration, Rhythm And Depth] : normal respiratory rhythm and effort [Exaggerated Use Of Accessory Muscles For Inspiration] : no accessory muscle use [Oriented To Time, Place, And Person] : oriented to person, place, and time [Affect] : the affect was normal [Mood] : the mood was normal [Not Anxious] : not anxious [Normal Station and Gait] : the gait and station were normal for the patient's age [No Focal Deficits] : no focal deficits [No Palpable Adenopathy] : no palpable adenopathy

## 2021-06-16 NOTE — HISTORY OF PRESENT ILLNESS
[FreeTextEntry1] : This patient presents for his regular checkup.  He has no symptoms and needs labs done.

## 2021-12-06 ENCOUNTER — APPOINTMENT (OUTPATIENT)
Dept: CARDIOLOGY | Facility: CLINIC | Age: 61
End: 2021-12-06
Payer: COMMERCIAL

## 2021-12-06 ENCOUNTER — NON-APPOINTMENT (OUTPATIENT)
Age: 61
End: 2021-12-06

## 2021-12-06 VITALS
OXYGEN SATURATION: 94 % | SYSTOLIC BLOOD PRESSURE: 115 MMHG | HEART RATE: 79 BPM | BODY MASS INDEX: 29.96 KG/M2 | WEIGHT: 214 LBS | DIASTOLIC BLOOD PRESSURE: 72 MMHG | HEIGHT: 71 IN

## 2021-12-06 PROCEDURE — 99214 OFFICE O/P EST MOD 30 MIN: CPT

## 2021-12-06 PROCEDURE — 93000 ELECTROCARDIOGRAM COMPLETE: CPT

## 2021-12-06 RX ORDER — ASPIRIN ENTERIC COATED TABLETS 81 MG 81 MG/1
81 TABLET, DELAYED RELEASE ORAL
Qty: 90 | Refills: 2 | Status: DISCONTINUED | COMMUNITY
Start: 2019-05-21 | End: 2021-12-06

## 2021-12-06 NOTE — CARDIOLOGY SUMMARY
[de-identified] : 12/6/21 normal sinus rhythm  [de-identified] : 2/15/18 10 METS  92%MPHR normal perfusion scan EF 49% [de-identified] : 3/12/20 Mild LVH EF 60% mild dilated aortic root 3.9 cm , Mild MR

## 2021-12-06 NOTE — DISCUSSION/SUMMARY
[FreeTextEntry1] : HTN hypertensive heart disease    controlled continue medication \par \par Mild dilated aortic root  probably normal for his height , continue to monitor \par \par Abnormal EKG normal variant ekg , \par \par Mild MR  continue to monitor \par \par DM type II , controlled continue diet restriction and medication \par \par Mixed hyperlipidemia : continue current medication \par \par \par renal cyst  advised to follow up with urologist \par BPH  continue medication

## 2021-12-12 ENCOUNTER — RX RENEWAL (OUTPATIENT)
Age: 61
End: 2021-12-12

## 2021-12-21 ENCOUNTER — APPOINTMENT (OUTPATIENT)
Dept: UROLOGY | Facility: CLINIC | Age: 61
End: 2021-12-21
Payer: COMMERCIAL

## 2021-12-21 VITALS
BODY MASS INDEX: 29.96 KG/M2 | WEIGHT: 214 LBS | HEIGHT: 71 IN | SYSTOLIC BLOOD PRESSURE: 120 MMHG | OXYGEN SATURATION: 96 % | DIASTOLIC BLOOD PRESSURE: 77 MMHG | HEART RATE: 85 BPM

## 2021-12-21 DIAGNOSIS — N52.9 MALE ERECTILE DYSFUNCTION, UNSPECIFIED: ICD-10-CM

## 2021-12-21 PROCEDURE — 99213 OFFICE O/P EST LOW 20 MIN: CPT

## 2021-12-21 RX ORDER — SILDENAFIL 20 MG/1
20 TABLET ORAL
Qty: 90 | Refills: 11 | Status: ACTIVE | COMMUNITY
Start: 2021-12-21 | End: 1900-01-01

## 2021-12-22 LAB
APPEARANCE: CLEAR
BACTERIA: NEGATIVE
BILIRUBIN URINE: NEGATIVE
BLOOD URINE: NEGATIVE
CALCIUM OXALATE CRYSTALS: ABNORMAL
COLOR: YELLOW
GLUCOSE QUALITATIVE U: NEGATIVE
HYALINE CASTS: 0 /LPF
KETONES URINE: NEGATIVE
LEUKOCYTE ESTERASE URINE: NEGATIVE
MICROSCOPIC-UA: NORMAL
NITRITE URINE: NEGATIVE
PH URINE: 6.5
PROTEIN URINE: NORMAL
PSA SERPL-MCNC: 1.02 NG/ML
RED BLOOD CELLS URINE: 3 /HPF
SPECIFIC GRAVITY URINE: 1.03
SQUAMOUS EPITHELIAL CELLS: 0 /HPF
UROBILINOGEN URINE: NORMAL
WHITE BLOOD CELLS URINE: 0 /HPF

## 2021-12-25 NOTE — PHYSICAL EXAM
[General Appearance - Well Developed] : well developed [General Appearance - Well Nourished] : well nourished [Normal Appearance] : normal appearance [Well Groomed] : well groomed [General Appearance - In No Acute Distress] : no acute distress [Abdomen Soft] : soft [Abdomen Tenderness] : non-tender [Costovertebral Angle Tenderness] : no ~M costovertebral angle tenderness [Urethral Meatus] : meatus normal [Urinary Bladder Findings] : the bladder was normal on palpation [Scrotum] : the scrotum was normal [Testes Mass (___cm)] : there were no testicular masses [Prostate Tenderness] : the prostate was not tender [No Prostate Nodules] : no prostate nodules [Edema] : no peripheral edema [] : no respiratory distress [Respiration, Rhythm And Depth] : normal respiratory rhythm and effort [Exaggerated Use Of Accessory Muscles For Inspiration] : no accessory muscle use [Oriented To Time, Place, And Person] : oriented to person, place, and time [Affect] : the affect was normal [Not Anxious] : not anxious [Mood] : the mood was normal [Normal Station and Gait] : the gait and station were normal for the patient's age [No Focal Deficits] : no focal deficits [No Palpable Adenopathy] : no palpable adenopathy

## 2021-12-25 NOTE — HISTORY OF PRESENT ILLNESS
[FreeTextEntry1] : This patient is here for a checkup.  He is having minimal urinary issues and feels that he is doing well.  He needs lab work done

## 2021-12-26 LAB — URINE CYTOLOGY: NORMAL

## 2021-12-28 ENCOUNTER — APPOINTMENT (OUTPATIENT)
Dept: ULTRASOUND IMAGING | Facility: CLINIC | Age: 61
End: 2021-12-28
Payer: COMMERCIAL

## 2021-12-28 ENCOUNTER — OUTPATIENT (OUTPATIENT)
Dept: OUTPATIENT SERVICES | Facility: HOSPITAL | Age: 61
LOS: 1 days | End: 2021-12-28
Payer: COMMERCIAL

## 2021-12-28 DIAGNOSIS — N28.1 CYST OF KIDNEY, ACQUIRED: ICD-10-CM

## 2021-12-28 PROCEDURE — 76775 US EXAM ABDO BACK WALL LIM: CPT | Mod: 26

## 2021-12-28 PROCEDURE — 76775 US EXAM ABDO BACK WALL LIM: CPT

## 2021-12-30 ENCOUNTER — NON-APPOINTMENT (OUTPATIENT)
Age: 61
End: 2021-12-30

## 2021-12-30 LAB
25(OH)D3 SERPL-MCNC: 71.5 NG/ML
ALBUMIN SERPL ELPH-MCNC: 4.5 G/DL
ALP BLD-CCNC: 69 U/L
ALT SERPL-CCNC: 33 U/L
ANION GAP SERPL CALC-SCNC: 13 MMOL/L
APPEARANCE: CLEAR
AST SERPL-CCNC: 30 U/L
BASOPHILS # BLD AUTO: 0.05 K/UL
BASOPHILS NFR BLD AUTO: 0.9 %
BILIRUB SERPL-MCNC: 0.8 MG/DL
BILIRUBIN URINE: NEGATIVE
BLOOD URINE: NEGATIVE
BUN SERPL-MCNC: 14 MG/DL
CALCIUM SERPL-MCNC: 9.5 MG/DL
CHLORIDE SERPL-SCNC: 104 MMOL/L
CHOLEST SERPL-MCNC: 164 MG/DL
CO2 SERPL-SCNC: 24 MMOL/L
COLOR: YELLOW
CREAT SERPL-MCNC: 1 MG/DL
EOSINOPHIL # BLD AUTO: 0.21 K/UL
EOSINOPHIL NFR BLD AUTO: 3.8 %
ESTIMATED AVERAGE GLUCOSE: 114 MG/DL
GLUCOSE QUALITATIVE U: NEGATIVE
GLUCOSE SERPL-MCNC: 120 MG/DL
HBA1C MFR BLD HPLC: 5.6 %
HCT VFR BLD CALC: 42.7 %
HDLC SERPL-MCNC: 36 MG/DL
HGB BLD-MCNC: 14 G/DL
IMM GRANULOCYTES NFR BLD AUTO: 0.4 %
KETONES URINE: NEGATIVE
LDLC SERPL CALC-MCNC: 86 MG/DL
LEUKOCYTE ESTERASE URINE: NEGATIVE
LYMPHOCYTES # BLD AUTO: 2.06 K/UL
LYMPHOCYTES NFR BLD AUTO: 37.3 %
MAN DIFF?: NORMAL
MCHC RBC-ENTMCNC: 31.6 PG
MCHC RBC-ENTMCNC: 32.8 GM/DL
MCV RBC AUTO: 96.4 FL
MONOCYTES # BLD AUTO: 0.46 K/UL
MONOCYTES NFR BLD AUTO: 8.3 %
NEUTROPHILS # BLD AUTO: 2.73 K/UL
NEUTROPHILS NFR BLD AUTO: 49.3 %
NITRITE URINE: NEGATIVE
NONHDLC SERPL-MCNC: 128 MG/DL
PH URINE: 6
PLATELET # BLD AUTO: 155 K/UL
POTASSIUM SERPL-SCNC: 4.6 MMOL/L
PROT SERPL-MCNC: 7.2 G/DL
PROTEIN URINE: NORMAL
PSA FREE FLD-MCNC: 29 %
PSA FREE SERPL-MCNC: 0.43 NG/ML
PSA SERPL-MCNC: 1.48 NG/ML
RBC # BLD: 4.43 M/UL
RBC # FLD: 12.1 %
SODIUM SERPL-SCNC: 142 MMOL/L
SPECIFIC GRAVITY URINE: 1.03
TRIGL SERPL-MCNC: 210 MG/DL
TSH SERPL-ACNC: 4.21 UIU/ML
UROBILINOGEN URINE: NORMAL
WBC # FLD AUTO: 5.53 K/UL

## 2022-05-18 NOTE — PHYSICAL EXAM
[No Acute Distress] : no acute distress [Obese] : obese [Normal Conjunctiva] : normal conjunctiva [Normal Venous Pressure] : normal venous pressure [No Carotid Bruit] : no carotid bruit [Normal S1, S2] : normal S1, S2 [No Murmur] : no murmur [No Rub] : no rub [No Gallop] : no gallop [Clear Lung Fields] : clear lung fields [Good Air Entry] : good air entry [No Respiratory Distress] : no respiratory distress  [Soft] : abdomen soft [Non Tender] : non-tender [No Masses/organomegaly] : no masses/organomegaly [Normal Bowel Sounds] : normal bowel sounds [No Edema] : no edema [Normal Gait] : normal gait [No Cyanosis] : no cyanosis [No Clubbing] : no clubbing [No Varicosities] : no varicosities [No Rash] : no rash [No Skin Lesions] : no skin lesions [Moves all extremities] : moves all extremities [No Focal Deficits] : no focal deficits [Normal Speech] : normal speech [Alert and Oriented] : alert and oriented [Normal memory] : normal memory

## 2022-05-23 ENCOUNTER — NON-APPOINTMENT (OUTPATIENT)
Age: 62
End: 2022-05-23

## 2022-05-23 ENCOUNTER — RX RENEWAL (OUTPATIENT)
Age: 62
End: 2022-05-23

## 2022-05-23 ENCOUNTER — APPOINTMENT (OUTPATIENT)
Dept: CARDIOLOGY | Facility: CLINIC | Age: 62
End: 2022-05-23
Payer: COMMERCIAL

## 2022-05-23 VITALS
HEART RATE: 80 BPM | HEIGHT: 71 IN | DIASTOLIC BLOOD PRESSURE: 88 MMHG | BODY MASS INDEX: 30.34 KG/M2 | OXYGEN SATURATION: 96 % | WEIGHT: 216.71 LBS | SYSTOLIC BLOOD PRESSURE: 139 MMHG

## 2022-05-23 VITALS — DIASTOLIC BLOOD PRESSURE: 76 MMHG | SYSTOLIC BLOOD PRESSURE: 130 MMHG

## 2022-05-23 PROCEDURE — 93000 ELECTROCARDIOGRAM COMPLETE: CPT

## 2022-05-23 PROCEDURE — 99214 OFFICE O/P EST MOD 30 MIN: CPT

## 2022-05-23 NOTE — CARDIOLOGY SUMMARY
[de-identified] : 12/6/21 normal sinus rhythm  [de-identified] : 2/15/18 10 METS  92%MPHR normal perfusion scan EF 49% [de-identified] : 3/12/20 Mild LVH EF 60% mild dilated aortic root 3.9 cm , Mild MR

## 2022-05-23 NOTE — HISTORY OF PRESENT ILLNESS
[FreeTextEntry1] : Patient  with hx diverticulosis , hyperlipidemia , elevated CPK , right renal cyst who came for follow up \par patient denies any active symptoms , \par \par \par patients echo showed  LVH with normal ventricular systolic function  patient does exercise regularly without any symptoms \par \par patient blood work showed  mild elevated BS  normal  Hb A1c , mild elevated TG low HDL  , normal BP on medication \par  . \par \par Patient was seen by dr mohan  , had scant blood in urine , CT abdomen showing right renal cyst 4.7 cm .

## 2022-05-23 NOTE — DISCUSSION/SUMMARY
[FreeTextEntry1] : HTN hypertensive heart disease    controlled continue medication , ramipril 5 mg po daily \par \par Mild dilated aortic root  probably normal for his height , continue to monitor  will obtain follow up echo \par \par Abnormal EKG normal variant ekg , \par \par Mild MR  continue to monitor \par \par DM type II , controlled continue diet restriction and medication \par \par blood work prior to next visit \par \par Mixed hyperlipidemia : continue current medication \par \par \par renal cyst  advised to follow up with urologist \par BPH  continue medication

## 2022-06-03 ENCOUNTER — APPOINTMENT (OUTPATIENT)
Dept: CARDIOLOGY | Facility: CLINIC | Age: 62
End: 2022-06-03
Payer: COMMERCIAL

## 2022-06-03 PROCEDURE — 93306 TTE W/DOPPLER COMPLETE: CPT

## 2022-08-15 ENCOUNTER — APPOINTMENT (OUTPATIENT)
Dept: UROLOGY | Facility: CLINIC | Age: 62
End: 2022-08-15

## 2022-08-15 VITALS
HEIGHT: 71 IN | BODY MASS INDEX: 30.24 KG/M2 | WEIGHT: 216 LBS | DIASTOLIC BLOOD PRESSURE: 79 MMHG | HEART RATE: 70 BPM | SYSTOLIC BLOOD PRESSURE: 130 MMHG | OXYGEN SATURATION: 97 %

## 2022-08-15 PROCEDURE — 99213 OFFICE O/P EST LOW 20 MIN: CPT

## 2022-09-19 ENCOUNTER — APPOINTMENT (OUTPATIENT)
Dept: CARDIOLOGY | Facility: CLINIC | Age: 62
End: 2022-09-19

## 2022-09-19 ENCOUNTER — NON-APPOINTMENT (OUTPATIENT)
Age: 62
End: 2022-09-19

## 2022-09-19 VITALS
DIASTOLIC BLOOD PRESSURE: 70 MMHG | WEIGHT: 215 LBS | HEIGHT: 71 IN | OXYGEN SATURATION: 97 % | HEART RATE: 81 BPM | SYSTOLIC BLOOD PRESSURE: 116 MMHG | BODY MASS INDEX: 30.1 KG/M2

## 2022-09-19 DIAGNOSIS — N28.1 CYST OF KIDNEY, ACQUIRED: ICD-10-CM

## 2022-09-19 PROCEDURE — 93000 ELECTROCARDIOGRAM COMPLETE: CPT

## 2022-09-19 PROCEDURE — 99214 OFFICE O/P EST MOD 30 MIN: CPT | Mod: 25

## 2022-09-19 NOTE — HISTORY OF PRESENT ILLNESS
[FreeTextEntry1] : Patient  with hx diverticulosis , hyperlipidemia , elevated CPK , right renal cyst who came for follow up \par patient denies any active symptoms , \par \par \par patients echo showed  LVH with normal ventricular systolic function  patient does exercise regularly without any symptoms \par \par patient blood work showed  mild elevated BS  normal  Hb A1c , mild elevated TG low HDL   , , normal BP on medication \par  . \par \par Patient was seen by dr mohan  , had scant blood in urine , CT abdomen showing right renal cyst small size 1.3  which was 4.7 cm in 2015   .

## 2022-09-19 NOTE — DISCUSSION/SUMMARY
[FreeTextEntry1] : HTN hypertensive heart disease    controlled continue medication , ramipril 5 mg po daily \par \par Mild dilated aortic root  probably normal for his height , continue to monitor  will obtain follow up echo \par \par Abnormal EKG normal variant ekg , \par \par Mild MR  continue to monitor \par \par DM type II , controlled continue diet restriction and medication \par \par \par \par Mixed hyperlipidemia : continue current medication \par \par renal cyst  small than before ?? advised to follow up with urologist \par BPH  continue medication

## 2022-09-19 NOTE — CARDIOLOGY SUMMARY
[de-identified] : 9/19/22 1 normal sinus rhythm  [de-identified] : 2/15/18 10 METS  92%MPHR normal perfusion scan EF 49% [de-identified] : 6/3 /22 Mild LVH EF 55 % mild dilated aortic root 3.9 cm , Mild MR ,mild to moderate PI

## 2022-10-13 PROBLEM — I51.89 MILD LEFT VENTRICULAR SYSTOLIC DYSFUNCTION: Status: ACTIVE | Noted: 2018-02-26

## 2022-11-18 ENCOUNTER — RX RENEWAL (OUTPATIENT)
Age: 62
End: 2022-11-18

## 2023-01-23 ENCOUNTER — APPOINTMENT (OUTPATIENT)
Dept: CARDIOLOGY | Facility: CLINIC | Age: 63
End: 2023-01-23
Payer: COMMERCIAL

## 2023-01-23 ENCOUNTER — NON-APPOINTMENT (OUTPATIENT)
Age: 63
End: 2023-01-23

## 2023-01-23 VITALS
HEIGHT: 71 IN | BODY MASS INDEX: 29.68 KG/M2 | OXYGEN SATURATION: 96 % | WEIGHT: 212 LBS | DIASTOLIC BLOOD PRESSURE: 76 MMHG | SYSTOLIC BLOOD PRESSURE: 110 MMHG | HEART RATE: 75 BPM

## 2023-01-23 DIAGNOSIS — I51.7 CARDIOMEGALY: ICD-10-CM

## 2023-01-23 DIAGNOSIS — R94.31 ABNORMAL ELECTROCARDIOGRAM [ECG] [EKG]: ICD-10-CM

## 2023-01-23 PROCEDURE — 93000 ELECTROCARDIOGRAM COMPLETE: CPT

## 2023-01-23 NOTE — CARDIOLOGY SUMMARY
[de-identified] : 1/23/23 normal sinus rhythm  [de-identified] : 2/15/18 10 METS  92%MPHR normal perfusion scan EF 49% [de-identified] : 6/3/22 Mild LVH EF 55 % mild dilated aortic root 3.9 cm , Mild MR ,mild to moderate PI

## 2023-01-23 NOTE — PHYSICAL EXAM
[Normal Conjunctiva] : normal conjunctiva [Normal Venous Pressure] : normal venous pressure [No Carotid Bruit] : no carotid bruit [Normal S1, S2] : normal S1, S2 [Clear Lung Fields] : clear lung fields [Good Air Entry] : good air entry [No Respiratory Distress] : no respiratory distress  [Soft] : abdomen soft [Non Tender] : non-tender [Normal Gait] : normal gait [No Edema] : no edema [Moves all extremities] : moves all extremities [No Focal Deficits] : no focal deficits [Normal Speech] : normal speech [Alert and Oriented] : alert and oriented [Normal memory] : normal memory [Normal] : well developed, well nourished, no acute distress

## 2023-01-23 NOTE — DISCUSSION/SUMMARY
[EKG obtained to assist in diagnosis and management of assessed problem(s)] : EKG obtained to assist in diagnosis and management of assessed problem(s) [FreeTextEntry1] : HTN hypertensive heart disease: controlled on current medication regimen advised low sodium diet \par \par HLD: CW statin labs ordered \par \par Dilated AO 3.9 cm by prior Echo 6/3/22 NL LV SYS FX EF 55%, Mild MR,  + DD, mild LVH will continue to monitor periodically will obtain US Aorta \par \par Abnormal EKG normal variant ekg , \par \par DM type II , controlled continue diet restriction and medication advised close follow up with Opthalmology/Podiatry will return for Carotid US and advise follow up stress test when back in town.  (Lives [place] ) \par \par Renal cyst/BPH: Followed with urology Dr Ya  \par \par Plan DW Patient

## 2023-01-23 NOTE — HISTORY OF PRESENT ILLNESS
[FreeTextEntry1] : 63 y/o male PMH: HTN, HLD, DM, diverticulosis , hyperlipidemia , elevated CPK , right renal cyst here today in routine cardiac follow up\par Claims to be feeling well no complaints offered   , Patient denies any chest pain or shortness of breath \par \par his blood pressure is well controlled  blood work showed Low HDL elevated TG  \par \par Patients echo showed  LVH with normal ventricular systolic function  patient does exercise regularly \par \par Patient was seen by dr mohan  , had scant blood in urine , CT abdomen showing right renal cyst small size 1.3  which was 4.7 cm in 2015   .

## 2023-02-13 ENCOUNTER — APPOINTMENT (OUTPATIENT)
Dept: CARDIOLOGY | Facility: CLINIC | Age: 63
End: 2023-02-13
Payer: COMMERCIAL

## 2023-02-13 PROCEDURE — 93880 EXTRACRANIAL BILAT STUDY: CPT

## 2023-02-13 PROCEDURE — 93978 VASCULAR STUDY: CPT

## 2023-05-13 ENCOUNTER — RX RENEWAL (OUTPATIENT)
Age: 63
End: 2023-05-13

## 2023-05-16 ENCOUNTER — RX RENEWAL (OUTPATIENT)
Age: 63
End: 2023-05-16

## 2023-06-25 NOTE — PHYSICAL EXAM
[Normal] : well developed, well nourished, no acute distress [Normal Conjunctiva] : normal conjunctiva [Normal Venous Pressure] : normal venous pressure [No Carotid Bruit] : no carotid bruit [Normal S1, S2] : normal S1, S2 [Clear Lung Fields] : clear lung fields [Good Air Entry] : good air entry [No Respiratory Distress] : no respiratory distress  [Soft] : abdomen soft [Non Tender] : non-tender [Normal Gait] : normal gait [No Edema] : no edema [Moves all extremities] : moves all extremities [No Focal Deficits] : no focal deficits [Normal Speech] : normal speech [Alert and Oriented] : alert and oriented [Normal memory] : normal memory

## 2023-06-26 ENCOUNTER — NON-APPOINTMENT (OUTPATIENT)
Age: 63
End: 2023-06-26

## 2023-06-26 ENCOUNTER — APPOINTMENT (OUTPATIENT)
Dept: CARDIOLOGY | Facility: CLINIC | Age: 63
End: 2023-06-26
Payer: COMMERCIAL

## 2023-06-26 VITALS
OXYGEN SATURATION: 96 % | BODY MASS INDEX: 30.1 KG/M2 | WEIGHT: 215 LBS | HEIGHT: 71 IN | HEART RATE: 81 BPM | DIASTOLIC BLOOD PRESSURE: 74 MMHG | SYSTOLIC BLOOD PRESSURE: 108 MMHG

## 2023-06-26 DIAGNOSIS — E78.5 HYPERLIPIDEMIA, UNSPECIFIED: ICD-10-CM

## 2023-06-26 DIAGNOSIS — I11.9 HYPERTENSIVE HEART DISEASE W/OUT HEART FAILURE: ICD-10-CM

## 2023-06-26 PROCEDURE — 93000 ELECTROCARDIOGRAM COMPLETE: CPT

## 2023-06-26 PROCEDURE — 99214 OFFICE O/P EST MOD 30 MIN: CPT | Mod: 25

## 2023-06-26 RX ORDER — METFORMIN HYDROCHLORIDE 500 MG/1
500 TABLET, FILM COATED, EXTENDED RELEASE ORAL
Qty: 90 | Refills: 1 | Status: ACTIVE | COMMUNITY
Start: 2023-06-26 | End: 1900-01-01

## 2023-06-26 NOTE — CARDIOLOGY SUMMARY
[de-identified] : 6/26/23 normal sinus rhythm  [de-identified] : 2/15/18 10 METS  92%MPHR normal perfusion scan EF 49% [de-identified] : 6/3/22 Mild LVH EF 55 % mild dilated aortic root 3.9 cm , Mild MR ,mild to moderate PI

## 2023-06-26 NOTE — HISTORY OF PRESENT ILLNESS
[FreeTextEntry1] : 61 y/o male PMH: HTN, HLD, DM, diverticulosis , hyperlipidemia , elevated CPK , right renal cyst here today in routine  follow up says he is feeling fine ,  Patient denies any chest pain or shortness of breath , patient does exercise regularly , \par \par his blood pressure is well controlled  blood work showed Low HDL elevated   LDL 85 , glucose 236 HB a1c 8.1    normal PSA \par \par Patients echo showed  LVH with normal ventricular systolic function  patient does exercise regularly \par \par Patient was seen by dr mohan  , had scant blood in urine , CT abdomen showing right renal cyst small size 1.3  which was 4.7 cm in 2015   .

## 2023-06-26 NOTE — DISCUSSION/SUMMARY
[FreeTextEntry1] : HTN hypertensive heart disease: controlled on current medication regimen advised low sodium diet \par \par HLD: improved ,  elevated TG  target LDL \par \par Dilated AO 3.9 cm by prior Echo 6/3/22 NL LV SYS FX EF 55%, Mild MR,  + DD, mild LVH will continue to monitor periodically will obtain US Aorta \par \par Abnormal EKG normal variant ekg , \par \par DM type II , suboptimal   continue diet restriction and will add metformin 500 mg po daily , addition , \par advised close follow up with Opthalmology/Podiatry will return follow up stress test when back in [facility].  (Lives [place] ) \par \par Renal cyst/BPH: Followed with urology Dr Ya  \par \par  [EKG obtained to assist in diagnosis and management of assessed problem(s)] : EKG obtained to assist in diagnosis and management of assessed problem(s)

## 2023-07-10 ENCOUNTER — RX RENEWAL (OUTPATIENT)
Age: 63
End: 2023-07-10

## 2023-08-16 ENCOUNTER — RX RENEWAL (OUTPATIENT)
Age: 63
End: 2023-08-16

## 2023-10-16 ENCOUNTER — APPOINTMENT (OUTPATIENT)
Dept: CARDIOLOGY | Facility: CLINIC | Age: 63
End: 2023-10-16

## 2023-10-18 ENCOUNTER — APPOINTMENT (OUTPATIENT)
Dept: CARDIOLOGY | Facility: CLINIC | Age: 63
End: 2023-10-18

## 2023-10-23 ENCOUNTER — APPOINTMENT (OUTPATIENT)
Dept: CARDIOLOGY | Facility: CLINIC | Age: 63
End: 2023-10-23

## 2023-11-12 ENCOUNTER — RX RENEWAL (OUTPATIENT)
Age: 63
End: 2023-11-12

## 2023-11-12 RX ORDER — RAMIPRIL 5 MG/1
5 CAPSULE ORAL
Qty: 90 | Refills: 0 | Status: ACTIVE | COMMUNITY
Start: 2018-02-26 | End: 1900-01-01

## 2023-12-29 ENCOUNTER — RX RENEWAL (OUTPATIENT)
Age: 63
End: 2023-12-29

## 2024-02-03 ENCOUNTER — RX RENEWAL (OUTPATIENT)
Age: 64
End: 2024-02-03

## 2024-02-03 RX ORDER — ATORVASTATIN CALCIUM 20 MG/1
20 TABLET, FILM COATED ORAL
Qty: 90 | Refills: 1 | Status: ACTIVE | COMMUNITY
Start: 2019-04-24 | End: 1900-01-01

## 2024-05-16 ENCOUNTER — APPOINTMENT (OUTPATIENT)
Dept: UROLOGY | Facility: CLINIC | Age: 64
End: 2024-05-16
Payer: COMMERCIAL

## 2024-05-16 DIAGNOSIS — N40.1 BENIGN PROSTATIC HYPERPLASIA WITH LOWER URINARY TRACT SYMPMS: ICD-10-CM

## 2024-05-16 DIAGNOSIS — N13.8 BENIGN PROSTATIC HYPERPLASIA WITH LOWER URINARY TRACT SYMPMS: ICD-10-CM

## 2024-05-16 DIAGNOSIS — R35.0 FREQUENCY OF MICTURITION: ICD-10-CM

## 2024-05-16 PROCEDURE — 99212 OFFICE O/P EST SF 10 MIN: CPT

## 2024-05-16 RX ORDER — TAMSULOSIN HYDROCHLORIDE 0.4 MG/1
0.4 CAPSULE ORAL
Qty: 90 | Refills: 3 | Status: ACTIVE | COMMUNITY
Start: 2019-12-03 | End: 1900-01-01

## 2024-05-16 NOTE — ADDENDUM
[FreeTextEntry1] : I, Mone Bradley, acted as a scribe on behalf of Dr. Ya 05/16/2024.   All medical record entries made by the Scribe were at my, Dr.Kip Ya, direction and personally dictated by me on 05/16/2024. I have reviewed the chart and agree that the record accurately reflects my personal performance of the history, physical exam, assessment, and plan. I have also personally directed, reviewed, and agreed with the chart.

## 2024-05-16 NOTE — END OF VISIT
[FreeTextEntry3] : Recommendations: Refill prescriptions as requested. Lab work has been ordered. Pt to follow up in one year, or as needed.

## 2024-05-17 LAB
APPEARANCE: CLEAR
BACTERIA: NEGATIVE /HPF
BILIRUBIN URINE: NEGATIVE
BLOOD URINE: NEGATIVE
CAST: 0 /LPF
COLOR: NORMAL
EPITHELIAL CELLS: 1 /HPF
GLUCOSE QUALITATIVE U: >=1000 MG/DL
KETONES URINE: NEGATIVE MG/DL
LEUKOCYTE ESTERASE URINE: NEGATIVE
MICROSCOPIC-UA: NORMAL
NITRITE URINE: NEGATIVE
PH URINE: 5.5
PROTEIN URINE: NEGATIVE MG/DL
PSA SERPL-MCNC: 0.99 NG/ML
RED BLOOD CELLS URINE: 0 /HPF
SPECIFIC GRAVITY URINE: 1.03
UROBILINOGEN URINE: 1 MG/DL
WHITE BLOOD CELLS URINE: 0 /HPF

## 2024-05-21 ENCOUNTER — NON-APPOINTMENT (OUTPATIENT)
Age: 64
End: 2024-05-21

## 2024-05-21 LAB — URINE CYTOLOGY: NORMAL

## 2024-06-27 ENCOUNTER — RX RENEWAL (OUTPATIENT)
Age: 64
End: 2024-06-27

## 2024-06-27 DIAGNOSIS — E11.9 TYPE 2 DIABETES MELLITUS W/OUT COMPLICATIONS: ICD-10-CM

## 2025-03-20 ENCOUNTER — RX RENEWAL (OUTPATIENT)
Age: 65
End: 2025-03-20

## 2025-08-30 ENCOUNTER — RX RENEWAL (OUTPATIENT)
Age: 65
End: 2025-08-30